# Patient Record
Sex: MALE | Race: BLACK OR AFRICAN AMERICAN | NOT HISPANIC OR LATINO | Employment: FULL TIME | URBAN - METROPOLITAN AREA
[De-identification: names, ages, dates, MRNs, and addresses within clinical notes are randomized per-mention and may not be internally consistent; named-entity substitution may affect disease eponyms.]

---

## 2021-11-18 ENCOUNTER — OFFICE VISIT (OUTPATIENT)
Dept: UROLOGY | Facility: CLINIC | Age: 21
End: 2021-11-18
Payer: COMMERCIAL

## 2021-11-18 VITALS
SYSTOLIC BLOOD PRESSURE: 119 MMHG | HEIGHT: 68 IN | WEIGHT: 209 LBS | DIASTOLIC BLOOD PRESSURE: 73 MMHG | HEART RATE: 100 BPM | BODY MASS INDEX: 31.67 KG/M2

## 2021-11-18 DIAGNOSIS — R30.0 DYSURIA: Primary | ICD-10-CM

## 2021-11-18 DIAGNOSIS — Z20.2 EXPOSURE TO STD: ICD-10-CM

## 2021-11-18 LAB
BILIRUB SERPL-MCNC: ABNORMAL MG/DL
BLOOD URINE, POC: NEGATIVE
CLARITY, POC UA: CLEAR
COLOR, POC UA: ABNORMAL
GLUCOSE UR QL STRIP: NORMAL
KETONES UR QL STRIP: ABNORMAL
LEUKOCYTE ESTERASE URINE, POC: ABNORMAL
NITRITE, POC UA: NEGATIVE
PH, POC UA: 5
PROTEIN, POC: ABNORMAL
SPECIFIC GRAVITY, POC UA: 1.02
UROBILINOGEN, POC UA: ABNORMAL

## 2021-11-18 PROCEDURE — 1160F PR REVIEW ALL MEDS BY PRESCRIBER/CLIN PHARMACIST DOCUMENTED: ICD-10-PCS | Mod: CPTII,S$GLB,, | Performed by: NURSE PRACTITIONER

## 2021-11-18 PROCEDURE — 99999 PR PBB SHADOW E&M-EST. PATIENT-LVL III: CPT | Mod: PBBFAC,,, | Performed by: NURSE PRACTITIONER

## 2021-11-18 PROCEDURE — 3008F BODY MASS INDEX DOCD: CPT | Mod: CPTII,S$GLB,, | Performed by: NURSE PRACTITIONER

## 2021-11-18 PROCEDURE — 1160F RVW MEDS BY RX/DR IN RCRD: CPT | Mod: CPTII,S$GLB,, | Performed by: NURSE PRACTITIONER

## 2021-11-18 PROCEDURE — 99999 PR PBB SHADOW E&M-EST. PATIENT-LVL III: ICD-10-PCS | Mod: PBBFAC,,, | Performed by: NURSE PRACTITIONER

## 2021-11-18 PROCEDURE — 87491 CHLMYD TRACH DNA AMP PROBE: CPT | Performed by: NURSE PRACTITIONER

## 2021-11-18 PROCEDURE — 3078F DIAST BP <80 MM HG: CPT | Mod: CPTII,S$GLB,, | Performed by: NURSE PRACTITIONER

## 2021-11-18 PROCEDURE — 96372 PR INJECTION,THERAP/PROPH/DIAG2ST, IM OR SUBCUT: ICD-10-PCS | Mod: S$GLB,,, | Performed by: NURSE PRACTITIONER

## 2021-11-18 PROCEDURE — 99203 OFFICE O/P NEW LOW 30 MIN: CPT | Mod: 25,S$GLB,, | Performed by: NURSE PRACTITIONER

## 2021-11-18 PROCEDURE — 3008F PR BODY MASS INDEX (BMI) DOCUMENTED: ICD-10-PCS | Mod: CPTII,S$GLB,, | Performed by: NURSE PRACTITIONER

## 2021-11-18 PROCEDURE — 81002 POCT URINE DIPSTICK WITHOUT MICROSCOPE: ICD-10-PCS | Mod: S$GLB,,, | Performed by: NURSE PRACTITIONER

## 2021-11-18 PROCEDURE — 3078F PR MOST RECENT DIASTOLIC BLOOD PRESSURE < 80 MM HG: ICD-10-PCS | Mod: CPTII,S$GLB,, | Performed by: NURSE PRACTITIONER

## 2021-11-18 PROCEDURE — 3074F SYST BP LT 130 MM HG: CPT | Mod: CPTII,S$GLB,, | Performed by: NURSE PRACTITIONER

## 2021-11-18 PROCEDURE — 3074F PR MOST RECENT SYSTOLIC BLOOD PRESSURE < 130 MM HG: ICD-10-PCS | Mod: CPTII,S$GLB,, | Performed by: NURSE PRACTITIONER

## 2021-11-18 PROCEDURE — 96372 THER/PROPH/DIAG INJ SC/IM: CPT | Mod: S$GLB,,, | Performed by: NURSE PRACTITIONER

## 2021-11-18 PROCEDURE — 1159F MED LIST DOCD IN RCRD: CPT | Mod: CPTII,S$GLB,, | Performed by: NURSE PRACTITIONER

## 2021-11-18 PROCEDURE — 1159F PR MEDICATION LIST DOCUMENTED IN MEDICAL RECORD: ICD-10-PCS | Mod: CPTII,S$GLB,, | Performed by: NURSE PRACTITIONER

## 2021-11-18 PROCEDURE — 81002 URINALYSIS NONAUTO W/O SCOPE: CPT | Mod: S$GLB,,, | Performed by: NURSE PRACTITIONER

## 2021-11-18 PROCEDURE — 87591 N.GONORRHOEAE DNA AMP PROB: CPT | Performed by: NURSE PRACTITIONER

## 2021-11-18 PROCEDURE — 87086 URINE CULTURE/COLONY COUNT: CPT | Performed by: NURSE PRACTITIONER

## 2021-11-18 PROCEDURE — 99203 PR OFFICE/OUTPT VISIT, NEW, LEVL III, 30-44 MIN: ICD-10-PCS | Mod: 25,S$GLB,, | Performed by: NURSE PRACTITIONER

## 2021-11-18 RX ORDER — AZITHROMYCIN 1 G/1
1 POWDER, FOR SUSPENSION ORAL ONCE
Qty: 1 PACKET | Refills: 0 | Status: SHIPPED | OUTPATIENT
Start: 2021-11-18 | End: 2021-11-18

## 2021-11-18 RX ORDER — CEFTRIAXONE 1 G/1
1 INJECTION, POWDER, FOR SOLUTION INTRAMUSCULAR; INTRAVENOUS
Status: DISCONTINUED | OUTPATIENT
Start: 2021-11-18 | End: 2021-11-18

## 2021-11-18 RX ORDER — CEFTRIAXONE 1 G/1
1 INJECTION, POWDER, FOR SOLUTION INTRAMUSCULAR; INTRAVENOUS
Status: COMPLETED | OUTPATIENT
Start: 2021-11-18 | End: 2021-11-18

## 2021-11-18 RX ADMIN — CEFTRIAXONE 1 G: 1 INJECTION, POWDER, FOR SOLUTION INTRAMUSCULAR; INTRAVENOUS at 08:11

## 2021-11-20 LAB — BACTERIA UR CULT: NO GROWTH

## 2021-11-22 ENCOUNTER — TELEPHONE (OUTPATIENT)
Dept: UROLOGY | Facility: CLINIC | Age: 21
End: 2021-11-22

## 2021-11-23 ENCOUNTER — TELEPHONE (OUTPATIENT)
Dept: UROLOGY | Facility: CLINIC | Age: 21
End: 2021-11-23

## 2021-11-23 LAB
C TRACH DNA SPEC QL NAA+PROBE: DETECTED
N GONORRHOEA DNA SPEC QL NAA+PROBE: DETECTED

## 2022-08-09 ENCOUNTER — TELEPHONE (OUTPATIENT)
Dept: INFECTIOUS DISEASES | Facility: CLINIC | Age: 22
End: 2022-08-09

## 2022-08-09 NOTE — TELEPHONE ENCOUNTER
8/9/22    Spoke w/pt advised call [facility] for PCP appointment, Urgent care or local health unit for evaluation of symptoms.  Patient expressed verbal understanding and stated will do so.      ----- Message from Tremontana Chevalier sent at 8/9/2022  4:07 PM CDT -----  Regarding: appt  Contact: pt @ [phone]  Pt calling to schedule a ppt with ID or urology, pt thinks he may have contracted an STD but not certain. Pt says symptom is funny feeling when urinating/no scent. Pt wanting to spk with someone to advice of which type of doctor he should see. Pt says he does not have a PCP. Pls call pt @ [phone].

## 2022-08-12 ENCOUNTER — LAB VISIT (OUTPATIENT)
Dept: LAB | Facility: HOSPITAL | Age: 22
End: 2022-08-12

## 2022-08-12 ENCOUNTER — OFFICE VISIT (OUTPATIENT)
Dept: INTERNAL MEDICINE | Facility: CLINIC | Age: 22
End: 2022-08-12
Payer: COMMERCIAL

## 2022-08-12 VITALS
SYSTOLIC BLOOD PRESSURE: 128 MMHG | DIASTOLIC BLOOD PRESSURE: 82 MMHG | OXYGEN SATURATION: 99 % | HEIGHT: 69 IN | WEIGHT: 220.88 LBS | BODY MASS INDEX: 32.72 KG/M2 | HEART RATE: 77 BPM

## 2022-08-12 DIAGNOSIS — Z00.00 ENCOUNTER FOR ANNUAL PHYSICAL EXAM: Primary | ICD-10-CM

## 2022-08-12 DIAGNOSIS — Z00.00 ENCOUNTER FOR ANNUAL PHYSICAL EXAM: ICD-10-CM

## 2022-08-12 DIAGNOSIS — Z86.19 HISTORY OF SYPHILIS: ICD-10-CM

## 2022-08-12 LAB
ALBUMIN SERPL BCP-MCNC: 4.2 G/DL (ref 3.5–5.2)
ALP SERPL-CCNC: 76 U/L (ref 55–135)
ALT SERPL W/O P-5'-P-CCNC: 12 U/L (ref 10–44)
ANION GAP SERPL CALC-SCNC: 7 MMOL/L (ref 8–16)
AST SERPL-CCNC: 18 U/L (ref 10–40)
BASOPHILS # BLD AUTO: 0.03 K/UL (ref 0–0.2)
BASOPHILS NFR BLD: 0.5 % (ref 0–1.9)
BILIRUB SERPL-MCNC: 0.8 MG/DL (ref 0.1–1)
BUN SERPL-MCNC: 18 MG/DL (ref 6–20)
CALCIUM SERPL-MCNC: 9.7 MG/DL (ref 8.7–10.5)
CHLORIDE SERPL-SCNC: 103 MMOL/L (ref 95–110)
CHOLEST SERPL-MCNC: 143 MG/DL (ref 120–199)
CHOLEST/HDLC SERPL: 2.7 {RATIO} (ref 2–5)
CO2 SERPL-SCNC: 27 MMOL/L (ref 23–29)
CREAT SERPL-MCNC: 1 MG/DL (ref 0.5–1.4)
DIFFERENTIAL METHOD: ABNORMAL
EOSINOPHIL # BLD AUTO: 0.1 K/UL (ref 0–0.5)
EOSINOPHIL NFR BLD: 0.9 % (ref 0–8)
ERYTHROCYTE [DISTWIDTH] IN BLOOD BY AUTOMATED COUNT: 12.7 % (ref 11.5–14.5)
EST. GFR  (NO RACE VARIABLE): >60 ML/MIN/1.73 M^2
ESTIMATED AVG GLUCOSE: 100 MG/DL (ref 68–131)
GLUCOSE SERPL-MCNC: 89 MG/DL (ref 70–110)
HBA1C MFR BLD: 5.1 % (ref 4–5.6)
HCT VFR BLD AUTO: 42.1 % (ref 40–54)
HDLC SERPL-MCNC: 53 MG/DL (ref 40–75)
HDLC SERPL: 37.1 % (ref 20–50)
HGB BLD-MCNC: 14.9 G/DL (ref 14–18)
IMM GRANULOCYTES # BLD AUTO: 0.01 K/UL (ref 0–0.04)
IMM GRANULOCYTES NFR BLD AUTO: 0.2 % (ref 0–0.5)
LDLC SERPL CALC-MCNC: 77.6 MG/DL (ref 63–159)
LYMPHOCYTES # BLD AUTO: 2.4 K/UL (ref 1–4.8)
LYMPHOCYTES NFR BLD: 44.1 % (ref 18–48)
MCH RBC QN AUTO: 31.1 PG (ref 27–31)
MCHC RBC AUTO-ENTMCNC: 35.4 G/DL (ref 32–36)
MCV RBC AUTO: 88 FL (ref 82–98)
MONOCYTES # BLD AUTO: 0.5 K/UL (ref 0.3–1)
MONOCYTES NFR BLD: 8.9 % (ref 4–15)
NEUTROPHILS # BLD AUTO: 2.5 K/UL (ref 1.8–7.7)
NEUTROPHILS NFR BLD: 45.4 % (ref 38–73)
NONHDLC SERPL-MCNC: 90 MG/DL
NRBC BLD-RTO: 0 /100 WBC
PLATELET # BLD AUTO: 283 K/UL (ref 150–450)
PMV BLD AUTO: 10.1 FL (ref 9.2–12.9)
POTASSIUM SERPL-SCNC: 4.2 MMOL/L (ref 3.5–5.1)
PROT SERPL-MCNC: 7.6 G/DL (ref 6–8.4)
RBC # BLD AUTO: 4.79 M/UL (ref 4.6–6.2)
SODIUM SERPL-SCNC: 137 MMOL/L (ref 136–145)
TRIGL SERPL-MCNC: 62 MG/DL (ref 30–150)
TSH SERPL DL<=0.005 MIU/L-ACNC: 0.58 UIU/ML (ref 0.4–4)
WBC # BLD AUTO: 5.51 K/UL (ref 3.9–12.7)

## 2022-08-12 PROCEDURE — 84443 ASSAY THYROID STIM HORMONE: CPT | Performed by: INTERNAL MEDICINE

## 2022-08-12 PROCEDURE — 3079F PR MOST RECENT DIASTOLIC BLOOD PRESSURE 80-89 MM HG: ICD-10-PCS | Mod: CPTII,S$GLB,, | Performed by: INTERNAL MEDICINE

## 2022-08-12 PROCEDURE — 3008F PR BODY MASS INDEX (BMI) DOCUMENTED: ICD-10-PCS | Mod: CPTII,S$GLB,, | Performed by: INTERNAL MEDICINE

## 2022-08-12 PROCEDURE — 3074F SYST BP LT 130 MM HG: CPT | Mod: CPTII,S$GLB,, | Performed by: INTERNAL MEDICINE

## 2022-08-12 PROCEDURE — 80053 COMPREHEN METABOLIC PANEL: CPT | Performed by: INTERNAL MEDICINE

## 2022-08-12 PROCEDURE — 1159F MED LIST DOCD IN RCRD: CPT | Mod: CPTII,S$GLB,, | Performed by: INTERNAL MEDICINE

## 2022-08-12 PROCEDURE — 3079F DIAST BP 80-89 MM HG: CPT | Mod: CPTII,S$GLB,, | Performed by: INTERNAL MEDICINE

## 2022-08-12 PROCEDURE — 3074F PR MOST RECENT SYSTOLIC BLOOD PRESSURE < 130 MM HG: ICD-10-PCS | Mod: CPTII,S$GLB,, | Performed by: INTERNAL MEDICINE

## 2022-08-12 PROCEDURE — 3008F BODY MASS INDEX DOCD: CPT | Mod: CPTII,S$GLB,, | Performed by: INTERNAL MEDICINE

## 2022-08-12 PROCEDURE — 99203 OFFICE O/P NEW LOW 30 MIN: CPT | Mod: S$GLB,,, | Performed by: INTERNAL MEDICINE

## 2022-08-12 PROCEDURE — 83036 HEMOGLOBIN GLYCOSYLATED A1C: CPT | Performed by: INTERNAL MEDICINE

## 2022-08-12 PROCEDURE — 99203 PR OFFICE/OUTPT VISIT, NEW, LEVL III, 30-44 MIN: ICD-10-PCS | Mod: S$GLB,,, | Performed by: INTERNAL MEDICINE

## 2022-08-12 PROCEDURE — 99999 PR PBB SHADOW E&M-EST. PATIENT-LVL III: ICD-10-PCS | Mod: PBBFAC,,, | Performed by: INTERNAL MEDICINE

## 2022-08-12 PROCEDURE — 85025 COMPLETE CBC W/AUTO DIFF WBC: CPT | Performed by: INTERNAL MEDICINE

## 2022-08-12 PROCEDURE — 86592 SYPHILIS TEST NON-TREP QUAL: CPT | Performed by: INTERNAL MEDICINE

## 2022-08-12 PROCEDURE — 1159F PR MEDICATION LIST DOCUMENTED IN MEDICAL RECORD: ICD-10-PCS | Mod: CPTII,S$GLB,, | Performed by: INTERNAL MEDICINE

## 2022-08-12 PROCEDURE — 80061 LIPID PANEL: CPT | Performed by: INTERNAL MEDICINE

## 2022-08-12 PROCEDURE — 99999 PR PBB SHADOW E&M-EST. PATIENT-LVL III: CPT | Mod: PBBFAC,,, | Performed by: INTERNAL MEDICINE

## 2022-08-12 PROCEDURE — 36415 COLL VENOUS BLD VENIPUNCTURE: CPT | Performed by: INTERNAL MEDICINE

## 2022-08-12 NOTE — PROGRESS NOTES
Subjective:       Patient ID: Tra Youssef is a 21 y.o. male.    Chief Complaint: Establish [facility]    HPI     Mr. Youssef is a 20 yo male who presents to [facility]. He is feeling well today with no new complaints.     He tested positive for syphilis in 11/2021 and was treated with IM penicillin in the emergency room. All other labs testing for STDs was negative including HIV, HSV, Hep C, were negative. He has no residual symptoms and denies any chest pain, SOB, headaches, vision changes or palpitations.     No medications.   No surgeries.   Works at [employer] in the [employer]. No smoking, alcohol or drugs.     Health Maintenance:  HIV: negative on 11/2021   Hep C: negative on 11/2021  Lipids: Order today   Vaccines: Tdap 2/2021, Flu due in 9/2022, HPV series completed in 2017, COVID x2     Review of Systems   Constitutional: Negative for activity change, chills, fatigue and fever.   HENT: Negative for sneezing, sore throat and tinnitus.    Respiratory: Negative for cough, chest tightness, shortness of breath and wheezing.    Cardiovascular: Negative for chest pain and leg swelling.   Gastrointestinal: Negative for abdominal pain, blood in stool and diarrhea.   Musculoskeletal: Negative for arthralgias and back pain.   Neurological: Negative for dizziness, seizures, light-headedness and headaches.             Objective:      Physical Exam  Constitutional:       Appearance: Normal appearance.   HENT:      Head: Normocephalic and atraumatic.   Cardiovascular:      Rate and Rhythm: Normal rate and regular rhythm.      Heart sounds: Normal heart sounds.   Pulmonary:      Effort: Pulmonary effort is normal.      Breath sounds: Normal breath sounds.   Abdominal:      General: Abdomen is flat.      Palpations: There is no mass.      Tenderness: There is no abdominal tenderness.   Skin:     General: Skin is warm and dry.   Neurological:      General: No focal deficit present.      Mental Status: He is alert and  oriented to person, place, and time.   Psychiatric:         Mood and Affect: Mood normal.         Behavior: Behavior normal.         Assessment:       Problem List Items Addressed This Visit    None     Visit Diagnoses     Encounter for annual physical exam    -  Primary    Relevant Orders    RPR    History of syphilis        Relevant Orders    Comprehensive Metabolic Panel    CBC Auto Differential    Lipid Panel    Hemoglobin A1C    TSH          Plan:         Tra was seen today for establish care.    Diagnoses and all orders for this visit:    Encounter for annual physical exam  -   Check lipids and basic labs today   HIV: negative on 11/2021   Hep C: negative on 11/2021  Lipids: Order today   Vaccines: Tdap 2/2021, Flu due in 9/2022, HPV series completed in 2017, COVID x2     History of syphilis    He tested positive for syphilis in 11/2021 and was treated with IM penicillin in the emergency room. All other labs testing for STDs was negative including HIV, HSV, Hep C, were negative. He has no residual symptoms and denies any chest pain, SOB, headaches, vision changes or palpitations.   -will repeat RPR today            Follow up one year for annual exam.     Yadi Gary MD   Internal Medicine   Primary Care

## 2022-08-13 LAB — RPR SER QL: NORMAL

## 2023-05-15 DIAGNOSIS — Z20.2 EXPOSURE TO STD: Primary | ICD-10-CM

## 2023-06-13 ENCOUNTER — PATIENT MESSAGE (OUTPATIENT)
Dept: RESEARCH | Facility: HOSPITAL | Age: 23
End: 2023-06-13
Payer: COMMERCIAL

## 2023-11-01 NOTE — PROGRESS NOTES
"Subjective:      Tra Youssef is a 23 y.o. male who returns today regarding his STDs.    Patient presents today unsure why he is here.  States that he got a letter in the mail stating it was time for appointment however the last time patient was seen in clinic was November 2021  He is only ever been seen for STD testing/treatment  States that he feels a nagging "buzzing sensation in his genitals" and would like to rechecked for STDs today.  Denies all other LUTS.  Denies gross hematuria.  Denies testicular pain.  Denies penile discharge.    The following portions of the patient's history were reviewed and updated as appropriate: allergies, current medications, past family history, past medical history, past social history, past surgical history and problem list.    Review of Systems  A comprehensive multipoint review of systems was negative except as otherwise stated in the HPI.     Objective:   Vitals: /73 (BP Location: Right arm, Patient Position: Sitting, BP Method: Large (Automatic))   Pulse 63   Resp 16   Ht 5' 9" (1.753 m)   Wt 97.6 kg (215 lb 2.7 oz)   BMI 31.77 kg/m²     Physical Exam   General: alert and oriented, no acute distress  Respiratory: Symmetric expansion, non-labored breathing  Cardiovascular: regular rate and rhythm, no peripheral edema  Abdomen: soft, non distended  Skin: normal coloration and turgor, no rashes, no suspicious skin lesions noted  Neuro: no gross deficits  Psych: normal judgment and insight, normal mood/affect, and non-anxious    Lab Review   Urinalysis demonstrates no ua   Lab Results   Component Value Date    WBC 5.51 08/12/2022    HGB 14.9 08/12/2022    HCT 42.1 08/12/2022    MCV 88 08/12/2022     08/12/2022     Lab Results   Component Value Date    CREATININE 0.82 02/04/2023    CREATININE 1.0 08/12/2022    BUN 16.0 02/04/2023    BUN 18 08/12/2022       Assessment and Plan:   1. Exposure to STD  - C. trachomatis/N. gonorrhoeae by AMP DNA [facility]; Urine  - " Trichomonas vaginalis, RNA, Qual, Urine       --will notify with results  --recommend patient follow-up with his primary care physician for future STD testing.    This note is dictated on M*Modal word recognition program.  There are word recognition mistakes that are occasionally missed on review.

## 2023-11-06 ENCOUNTER — OFFICE VISIT (OUTPATIENT)
Dept: UROLOGY | Facility: CLINIC | Age: 23
End: 2023-11-06
Payer: COMMERCIAL

## 2023-11-06 VITALS
WEIGHT: 215.19 LBS | BODY MASS INDEX: 31.87 KG/M2 | SYSTOLIC BLOOD PRESSURE: 119 MMHG | HEIGHT: 69 IN | RESPIRATION RATE: 16 BRPM | HEART RATE: 63 BPM | DIASTOLIC BLOOD PRESSURE: 73 MMHG

## 2023-11-06 DIAGNOSIS — Z20.2 EXPOSURE TO STD: Primary | ICD-10-CM

## 2023-11-06 PROCEDURE — 99213 OFFICE O/P EST LOW 20 MIN: CPT | Mod: S$GLB,,, | Performed by: NURSE PRACTITIONER

## 2023-11-06 PROCEDURE — 99999 PR PBB SHADOW E&M-EST. PATIENT-LVL III: CPT | Mod: PBBFAC,,, | Performed by: NURSE PRACTITIONER

## 2023-11-06 PROCEDURE — 87491 CHLMYD TRACH DNA AMP PROBE: CPT | Performed by: NURSE PRACTITIONER

## 2023-11-06 PROCEDURE — 87661 TRICHOMONAS VAGINALIS AMPLIF: CPT | Performed by: NURSE PRACTITIONER

## 2023-11-06 PROCEDURE — 99213 PR OFFICE/OUTPT VISIT, EST, LEVL III, 20-29 MIN: ICD-10-PCS | Mod: S$GLB,,, | Performed by: NURSE PRACTITIONER

## 2023-11-06 PROCEDURE — 87591 N.GONORRHOEAE DNA AMP PROB: CPT | Performed by: NURSE PRACTITIONER

## 2023-11-06 PROCEDURE — 99999 PR PBB SHADOW E&M-EST. PATIENT-LVL III: ICD-10-PCS | Mod: PBBFAC,,, | Performed by: NURSE PRACTITIONER

## 2023-11-07 LAB
C TRACH DNA SPEC QL NAA+PROBE: DETECTED
N GONORRHOEA DNA SPEC QL NAA+PROBE: DETECTED

## 2023-11-08 ENCOUNTER — TELEPHONE (OUTPATIENT)
Dept: UROLOGY | Facility: CLINIC | Age: 23
End: 2023-11-08
Payer: COMMERCIAL

## 2023-11-08 DIAGNOSIS — A74.9 CHLAMYDIA: ICD-10-CM

## 2023-11-08 DIAGNOSIS — A54.9 GONORRHEA: Primary | ICD-10-CM

## 2023-11-08 RX ORDER — AZITHROMYCIN 1 G/1
1 POWDER, FOR SUSPENSION ORAL ONCE
Qty: 1 PACKET | Refills: 0 | Status: SHIPPED | OUTPATIENT
Start: 2023-11-08 | End: 2023-11-08

## 2023-11-08 NOTE — TELEPHONE ENCOUNTER
----- Message from Sarah Beth Diamond NP sent at 11/8/2023 10:48 AM CST -----  Please call patient and let him know that his urine test came back positive for chlamydia and gonorrhea.  I have sent a prescription for azithromycin to his pharmacy.  It is a 1 time dose that will treat both infections.  He should also notify any recent sexual partners of this infection so they can be tested and treated.  I have also placed a referral for infectious disease he needs to follow-up with them for further testing and evaluation.    Thanks   M

## 2023-11-08 NOTE — TELEPHONE ENCOUNTER
Informed patient per Sarah Beth about urine results.  Informed patient that prescription was sent to pharmacy.  Informed patient a referral has been place for infectious disease.  Advised patient to give us a call if he has problems with scheduling infectious disease appt.    PASCUAL Loera

## 2023-11-09 LAB
SPECIMEN SOURCE: NORMAL
T VAGINALIS RRNA SPEC QL NAA+PROBE: NEGATIVE

## 2023-12-10 NOTE — PROGRESS NOTES
Subjective:      Patient ID: Tra Youssef is a 23 y.o. male.    Chief Complaint:Follow-up and Exposure to STD      History of Present Illness    Tra Youssef is referred by Urology for GC/chlamydia infection.  He was evaluated by Urology on 11/8.  Urine for GC/chlamydia was positive and prescription for azithromycin was sent and he was referred to ID for follow up.      Reports he took the azithromycin as directed and notified his partner.    Yes, he took the azithro.       Immunizations:  Has received HPV, Hep A, and Hep B    He is not currently having sex - not since October.  In recent months, prior to that, he only had one female partner.    Oral/genital/anal sex with female partners only.   He believes his prior partner may have had other sexual partners.   Denies IVDU.  Reports his partner did not use IVD  Typically uses condoms at the beginning of relationship.  Then no condom use.  Uses condoms with any new sexual partner     Past STI   History of GC/chlamydia in the past.   Received treatment with azithromycin and ceftriaxone  History of syphilis in 2021.  Treated with one dose bicillin.  Repeat RPR was negative in 2022    Denies fevers, chills.  Currently without genital sores, no penile discharge, no dysuria, no skin rashes, no lymphadenopathy   Complains of sore throat and congestion    Review of Systems   Constitutional: Positive for malaise/fatigue. Negative for chills, decreased appetite, fever, night sweats, weight gain and weight loss.   HENT:  Positive for congestion and sore throat. Negative for ear pain, hearing loss, hoarse voice and tinnitus.    Eyes:  Negative for blurred vision, redness and visual disturbance.   Cardiovascular:  Negative for chest pain, leg swelling and palpitations.   Respiratory:  Negative for cough, hemoptysis, shortness of breath, sputum production and wheezing.    Hematologic/Lymphatic: Negative for adenopathy. Does not bruise/bleed easily.   Skin:  Negative for  dry skin, itching, rash and suspicious lesions.   Musculoskeletal:  Negative for back pain, joint pain, myalgias and neck pain.   Gastrointestinal:  Negative for abdominal pain, constipation, diarrhea, heartburn, nausea and vomiting.   Genitourinary:  Negative for dysuria, flank pain, frequency, hematuria, hesitancy and urgency.   Neurological:  Negative for dizziness, headaches, numbness, paresthesias and weakness.   Psychiatric/Behavioral:  Negative for depression and memory loss. The patient does not have insomnia and is not nervous/anxious.    Allergic/Immunologic: Negative for environmental allergies, HIV exposure, hives and persistent infections.     Objective:   Physical Exam  Constitutional:       General: He is not in acute distress.     Appearance: Normal appearance. He is not ill-appearing, toxic-appearing or diaphoretic.   HENT:      Head: Normocephalic and atraumatic.      Nose: Nose normal.      Mouth/Throat:      Mouth: Mucous membranes are moist.      Pharynx: No oropharyngeal exudate or posterior oropharyngeal erythema.   Eyes:      General: No scleral icterus.     Conjunctiva/sclera: Conjunctivae normal.   Cardiovascular:      Rate and Rhythm: Normal rate and regular rhythm.   Pulmonary:      Effort: Pulmonary effort is normal. No respiratory distress.   Abdominal:      General: There is no distension.      Palpations: Abdomen is soft.   Genitourinary:     Comments: deferred  Musculoskeletal:      Cervical back: Normal range of motion.      Right lower leg: No edema.      Left lower leg: No edema.   Skin:     General: Skin is warm and dry.      Findings: No rash.   Neurological:      Mental Status: He is alert and oriented to person, place, and time.   Psychiatric:         Mood and Affect: Mood normal.         Behavior: Behavior normal.       Assessment:     1. Gonorrhea    2. Chlamydia infection    3. Routine screening for STI (sexually transmitted infection)    4. Exposure to STD    5. Chlamydia         Plan:       Ceftriaxone 500 mg injection X1 today    Will repeat the GC/chlamydia urine and oropharyngeal    HCV, HIV, Hepatitis B antibodies/antigen, RPR   Advised to  abstain from sex for at least 7 days after treatment    Follow up results.  If chlamydia still positive, treat with doxycycline 100 mg q 12 hours X 7 days    If oropharyngeal GC positive - will plan test of cure.  Otherwise repeat testing in 3 months    The patient understands and agrees to plan of care and all questions were answered.   Encouraged to call with questions or concerns.  Given my contact information.         45 minutes of total time was spent on this encounter, which includes face to face time and non-face to face time preparing to see the patient (eg, review of tests), Obtaining and/or reviewing separately obtained history, documenting clinical information in the electronic or other health record, independently interpreting results (not separately reported) and communicating results to the patient/family/caregiver, or care coordination (not separately reported).

## 2023-12-11 ENCOUNTER — OFFICE VISIT (OUTPATIENT)
Dept: INFECTIOUS DISEASES | Facility: CLINIC | Age: 23
End: 2023-12-11
Payer: COMMERCIAL

## 2023-12-11 ENCOUNTER — LAB VISIT (OUTPATIENT)
Dept: LAB | Facility: HOSPITAL | Age: 23
End: 2023-12-11
Payer: COMMERCIAL

## 2023-12-11 ENCOUNTER — CLINICAL SUPPORT (OUTPATIENT)
Dept: INFECTIOUS DISEASES | Facility: CLINIC | Age: 23
End: 2023-12-11
Payer: COMMERCIAL

## 2023-12-11 VITALS
DIASTOLIC BLOOD PRESSURE: 79 MMHG | SYSTOLIC BLOOD PRESSURE: 128 MMHG | WEIGHT: 208.56 LBS | BODY MASS INDEX: 30.89 KG/M2 | HEIGHT: 69 IN | TEMPERATURE: 98 F | HEART RATE: 52 BPM

## 2023-12-11 DIAGNOSIS — Z20.2 EXPOSURE TO STD: ICD-10-CM

## 2023-12-11 DIAGNOSIS — Z11.3 ROUTINE SCREENING FOR STI (SEXUALLY TRANSMITTED INFECTION): ICD-10-CM

## 2023-12-11 DIAGNOSIS — A74.9 CHLAMYDIA: ICD-10-CM

## 2023-12-11 DIAGNOSIS — A54.9 GONORRHEA: ICD-10-CM

## 2023-12-11 DIAGNOSIS — A74.9 CHLAMYDIA INFECTION: ICD-10-CM

## 2023-12-11 DIAGNOSIS — A54.9 GONORRHEA: Primary | ICD-10-CM

## 2023-12-11 LAB
HBV CORE IGM SERPL QL IA: NORMAL
HBV SURFACE AB SER-ACNC: 26.89 MIU/ML
HBV SURFACE AB SER-ACNC: REACTIVE M[IU]/ML
HBV SURFACE AG SERPL QL IA: NORMAL
HCV AB SERPL QL IA: NORMAL
HIV 1+2 AB+HIV1 P24 AG SERPL QL IA: NORMAL

## 2023-12-11 PROCEDURE — 99999 PR PBB SHADOW E&M-EST. PATIENT-LVL I: ICD-10-PCS | Mod: PBBFAC,,,

## 2023-12-11 PROCEDURE — 99215 OFFICE O/P EST HI 40 MIN: CPT | Mod: S$GLB,,, | Performed by: NURSE PRACTITIONER

## 2023-12-11 PROCEDURE — 86803 HEPATITIS C AB TEST: CPT | Performed by: NURSE PRACTITIONER

## 2023-12-11 PROCEDURE — 96372 THER/PROPH/DIAG INJ SC/IM: CPT | Mod: S$GLB,,, | Performed by: INTERNAL MEDICINE

## 2023-12-11 PROCEDURE — 87491 CHLMYD TRACH DNA AMP PROBE: CPT | Performed by: NURSE PRACTITIONER

## 2023-12-11 PROCEDURE — 99999 PR PBB SHADOW E&M-EST. PATIENT-LVL I: CPT | Mod: PBBFAC,,,

## 2023-12-11 PROCEDURE — 99999 PR PBB SHADOW E&M-EST. PATIENT-LVL IV: CPT | Mod: PBBFAC,,, | Performed by: NURSE PRACTITIONER

## 2023-12-11 PROCEDURE — 99999 PR PBB SHADOW E&M-EST. PATIENT-LVL IV: ICD-10-PCS | Mod: PBBFAC,,, | Performed by: NURSE PRACTITIONER

## 2023-12-11 PROCEDURE — 86705 HEP B CORE ANTIBODY IGM: CPT | Performed by: NURSE PRACTITIONER

## 2023-12-11 PROCEDURE — 87389 HIV-1 AG W/HIV-1&-2 AB AG IA: CPT | Performed by: NURSE PRACTITIONER

## 2023-12-11 PROCEDURE — 99215 PR OFFICE/OUTPT VISIT, EST, LEVL V, 40-54 MIN: ICD-10-PCS | Mod: S$GLB,,, | Performed by: NURSE PRACTITIONER

## 2023-12-11 PROCEDURE — 86706 HEP B SURFACE ANTIBODY: CPT | Performed by: NURSE PRACTITIONER

## 2023-12-11 PROCEDURE — 87491 CHLMYD TRACH DNA AMP PROBE: CPT | Mod: 59 | Performed by: NURSE PRACTITIONER

## 2023-12-11 PROCEDURE — 96372 PR INJECTION,THERAP/PROPH/DIAG2ST, IM OR SUBCUT: ICD-10-PCS | Mod: S$GLB,,, | Performed by: INTERNAL MEDICINE

## 2023-12-11 PROCEDURE — 87591 N.GONORRHOEAE DNA AMP PROB: CPT | Performed by: NURSE PRACTITIONER

## 2023-12-11 PROCEDURE — 87340 HEPATITIS B SURFACE AG IA: CPT | Performed by: NURSE PRACTITIONER

## 2023-12-11 PROCEDURE — 86592 SYPHILIS TEST NON-TREP QUAL: CPT | Performed by: NURSE PRACTITIONER

## 2023-12-11 PROCEDURE — 87591 N.GONORRHOEAE DNA AMP PROB: CPT | Mod: 59 | Performed by: NURSE PRACTITIONER

## 2023-12-11 PROCEDURE — 36415 COLL VENOUS BLD VENIPUNCTURE: CPT | Performed by: NURSE PRACTITIONER

## 2023-12-11 RX ORDER — AZITHROMYCIN 1 G/1
POWDER, FOR SUSPENSION ORAL
COMMUNITY
Start: 2023-11-08

## 2023-12-11 NOTE — PATIENT INSTRUCTIONS
Ceftriaxone 500 mg injection X1 today    Will repeat the GC/chlamydia urine and oropharyngeal test today    Will check Hepatitis C, HIV, Hepatitis B, and RPR (syphilis ) today    If your chlamydia returns positive, will start another oral antibiotic called doxyclycline X 7 days.     Abstain from sex for at least 7 days after treatment    I'll call you with test results

## 2023-12-11 NOTE — PROGRESS NOTES
Patient received ceftriaxone 500 mg mixed with 2 ml xylocaine IM to the right buttocks.  Tolerated well and left in NAD

## 2023-12-12 LAB
C TRACH DNA SPEC QL NAA+PROBE: NOT DETECTED
N GONORRHOEA DNA SPEC QL NAA+PROBE: NOT DETECTED
RPR SER QL: NORMAL

## 2023-12-13 ENCOUNTER — TELEPHONE (OUTPATIENT)
Dept: INFECTIOUS DISEASES | Facility: CLINIC | Age: 23
End: 2023-12-13
Payer: COMMERCIAL

## 2023-12-13 LAB
C TRACH RRNA SPEC QL NAA+PROBE: NEGATIVE
N GONORRHOEA RRNA SPEC QL NAA+PROBE: POSITIVE
N.GONORROHEAE, AMP RNA SOURCE: ABNORMAL
SPECIMEN SOURCE: ABNORMAL

## 2023-12-14 ENCOUNTER — TELEPHONE (OUTPATIENT)
Dept: INFECTIOUS DISEASES | Facility: CLINIC | Age: 23
End: 2023-12-14
Payer: COMMERCIAL

## 2023-12-14 NOTE — TELEPHONE ENCOUNTER
Called patient to advised of positive throat swab result  He will come back in 30 days for test of cure.   He will make the appointment and reach out to me if any difficulties

## 2024-01-08 ENCOUNTER — TELEPHONE (OUTPATIENT)
Dept: INFECTIOUS DISEASES | Facility: CLINIC | Age: 24
End: 2024-01-08

## 2024-01-08 NOTE — TELEPHONE ENCOUNTER
----- Message from SCAR Saenz, ANP sent at 1/7/2024 12:40 PM CST -----  Will you call patient and set up a follow up with me on 1/17 to re-swab his throat?   Thanks.

## 2024-01-18 ENCOUNTER — TELEPHONE (OUTPATIENT)
Dept: INFECTIOUS DISEASES | Facility: CLINIC | Age: 24
End: 2024-01-18

## 2024-01-18 NOTE — TELEPHONE ENCOUNTER
----- Message from SCAR Saenz, ANP sent at 1/18/2024  1:21 PM CST -----  This fellow needs a re-swab and canceled yesterday's appt.   Will you call him and re-schedule?   Thanks.

## 2024-01-29 ENCOUNTER — LAB VISIT (OUTPATIENT)
Dept: LAB | Facility: HOSPITAL | Age: 24
End: 2024-01-29
Payer: COMMERCIAL

## 2024-01-29 ENCOUNTER — OFFICE VISIT (OUTPATIENT)
Dept: INFECTIOUS DISEASES | Facility: CLINIC | Age: 24
End: 2024-01-29
Payer: COMMERCIAL

## 2024-01-29 VITALS
TEMPERATURE: 98 F | HEART RATE: 75 BPM | SYSTOLIC BLOOD PRESSURE: 117 MMHG | BODY MASS INDEX: 32.2 KG/M2 | WEIGHT: 217.38 LBS | HEIGHT: 69 IN | DIASTOLIC BLOOD PRESSURE: 70 MMHG

## 2024-01-29 DIAGNOSIS — A54.9 GONORRHEA: ICD-10-CM

## 2024-01-29 DIAGNOSIS — Z20.2 EXPOSURE TO STD: Primary | ICD-10-CM

## 2024-01-29 DIAGNOSIS — Z20.2 EXPOSURE TO STD: ICD-10-CM

## 2024-01-29 LAB — HIV 1+2 AB+HIV1 P24 AG SERPL QL IA: NORMAL

## 2024-01-29 PROCEDURE — 87591 N.GONORRHOEAE DNA AMP PROB: CPT | Performed by: STUDENT IN AN ORGANIZED HEALTH CARE EDUCATION/TRAINING PROGRAM

## 2024-01-29 PROCEDURE — 36415 COLL VENOUS BLD VENIPUNCTURE: CPT | Performed by: STUDENT IN AN ORGANIZED HEALTH CARE EDUCATION/TRAINING PROGRAM

## 2024-01-29 PROCEDURE — 99999 PR PBB SHADOW E&M-EST. PATIENT-LVL III: CPT | Mod: PBBFAC,,, | Performed by: STUDENT IN AN ORGANIZED HEALTH CARE EDUCATION/TRAINING PROGRAM

## 2024-01-29 PROCEDURE — 87389 HIV-1 AG W/HIV-1&-2 AB AG IA: CPT | Performed by: STUDENT IN AN ORGANIZED HEALTH CARE EDUCATION/TRAINING PROGRAM

## 2024-01-29 PROCEDURE — 99213 OFFICE O/P EST LOW 20 MIN: CPT | Mod: S$GLB,,, | Performed by: STUDENT IN AN ORGANIZED HEALTH CARE EDUCATION/TRAINING PROGRAM

## 2024-01-29 NOTE — PROGRESS NOTES
Infectious Disease Clinic Visit    Reason:    ID clinic f/u for STI     HPI:        23M, heterosexual, who previously completed STI testing with urology 11/06; Urine G&C positive. Trich negative. Tx'd with azithromycin and later seen as referral in ID 12/11/23.     Last sexual encounter: 10/2023, female partner; non-mutually monogamous. He believes his prior partner may have had other sexual partners.  In recent months, prior to that, he only had one female partner.    Oral/genital/anal sex; historically female partners only.   Typically uses condoms at the beginning of relationship; then no condom use. Uses condoms with any new sexual partner.     Past STI   History of GC/chlamydia in the past (11/2021), tx'd with azithromycin and ceftriaxone.  History of syphilis in 2021.  Treated with one dose bicillin. Repeat RPR was negative in 2022    At prior ID clinic visit 12/11; repeat urine studies again positive for both G/C. Also, throat cx +Gonorrhea; with associated sore throat / congestion at that time; and testicular discomfort. But no skin lesions, urethral d/c, oral lesions, LAD, body aches, joint pain, or f/c/s. Pt received IM-ceftriaxone and azithromycin in ID clinic; completed with full resolution of sxs. Of note, other STI labs done 12/11: HIV negative, RPR non-reative, HCV Ab negative, HepB studies demonstrate immunity from prior immunization.     TODAY, 01/29, Pt presents for scheduled f/u appt for BREEZY for oropharyngeal Gonococcal infection, s/p tx't 12/11/23 with IM CTX.  Pt remains asymptomatic after tx't. Pt reports that last sexual encounter remains the same, 10/2023, and states female partner exposed to at that time, has been evaluated and treated as well.         Review of Systems   Constitutional:  Negative for chills, diaphoresis, fever and weight loss.   HENT:  Negative for congestion, sinus pain and sore throat.    Eyes:  Negative for pain and discharge.   Respiratory:  Negative for cough, sputum  production and shortness of breath.    Cardiovascular:  Negative for chest pain and leg swelling.   Gastrointestinal:  Negative for abdominal pain, diarrhea, nausea and vomiting.   Genitourinary:  Negative for dysuria and hematuria.   Musculoskeletal:  Negative for joint pain.   Skin:  Negative for rash.   Neurological:  Negative for focal weakness and headaches.   Endo/Heme/Allergies:  Negative for environmental allergies.   Psychiatric/Behavioral:  Negative for substance abuse. The patient is not nervous/anxious.          Physical Exam  Vitals reviewed.   Constitutional:       General: He is not in acute distress.     Appearance: Normal appearance. He is not ill-appearing, toxic-appearing or diaphoretic.   HENT:      Head: Normocephalic and atraumatic.      Nose:      Comments: No Lymphadenopathy.     Mouth/Throat:      Mouth: Mucous membranes are moist.      Pharynx: Oropharynx is clear. Posterior oropharyngeal erythema present.      Comments: Mild erythema of oropharynx  Eyes:      General: No scleral icterus.     Conjunctiva/sclera: Conjunctivae normal.   Cardiovascular:      Rate and Rhythm: Normal rate.      Heart sounds: Normal heart sounds.   Pulmonary:      Effort: Pulmonary effort is normal.      Breath sounds: Normal breath sounds.   Abdominal:      General: Abdomen is flat. Bowel sounds are normal.      Palpations: Abdomen is soft.      Tenderness: There is no abdominal tenderness.   Musculoskeletal:         General: No swelling or tenderness. Normal range of motion.      Cervical back: Normal range of motion. No tenderness.      Right lower leg: No edema.      Left lower leg: No edema.   Lymphadenopathy:      Cervical: No cervical adenopathy.   Skin:     General: Skin is warm and dry.      Coloration: Skin is not jaundiced.      Findings: No erythema or rash.   Neurological:      Mental Status: He is alert and oriented to person, place, and time. Mental status is at baseline.   Psychiatric:          "Behavior: Behavior normal.         Thought Content: Thought content normal.           Review of patient's allergies indicates:  No Known Allergies      Current Outpatient Medications:     azithromycin (ZITHROMAX) 1 gram Pack, SMARTSI Gram(s) By Mouth Once, Disp: , Rfl:     History reviewed. No pertinent past medical history.    Lab Results   Component Value Date    WBC 5.51 2022    CREATININE 0.82 2023    AST 19 2023    ALT 10 2023    ALKPHOS 76 2022       Immunization History   Administered Date(s) Administered    Tdap 2021         Reviewed available labs and imaging.     Vitals:    24 1108   BP: 117/70   BP Location: Left arm   Pulse: 75   Temp: 98.2 °F (36.8 °C)   TempSrc: Oral   Weight: 98.6 kg (217 lb 6 oz)   Height: 5' 9" (1.753 m)         Assessment:  Encounter Diagnoses   Name Primary?    Exposure to STD Yes    Gonorrhea    23M, heterosexual male, with prior G/C and syphilis infection (tx'd then) -- now with more recent G/C urine positive  and throat +G () s/p IM-CTX and azithromycin; with resolution of sore throat. Denies sexual encounter since tx't, last encounter remains 10/2023.    Plan:     Completed G/C throat swab for BREEZY, and repeat HIV 1/2 Ab to confirm remains negative >3mo s/p last sexual encounter (10/2023).  If repeat G/C throat swab positive, will repeat tx with IM CTX; otherwise if negative, then no further ID work-up or abx indicated. Pt aware and understood. Will f/u G/C throat swab final results.      Follow-up: PRN      Orders Placed This Encounter   Procedures    C.TRACH/N.GONOR AMP RNA (Ocular/Fluid)    HIV 1/2 Ag/Ab (4th Gen)       "

## 2024-01-30 LAB
C TRACH RRNA SPEC QL NAA+PROBE: NORMAL
C.TRACH RNA SOURCE: NORMAL
N.GONORROHEAE, AMP RNA SOURCE: NORMAL
N.GONORROHEAE, MISC. AMP RNA: NORMAL

## 2024-02-23 PROBLEM — A54.5 PHARYNGITIS, GONOCOCCAL: Status: ACTIVE | Noted: 2024-02-23

## 2024-02-23 NOTE — PROGRESS NOTES
Infectious Disease Clinic Visit    Reason:    ID clinic f/u for STI     HPI:        23M, heterosexual, who previously completed STI testing with urology 11/06; Urine G&C positive. Trich negative. Tx'd with azithromycin and later seen as referral in ID 12/11/23.     Last sexual encounter: 10/2023, female partner; non-mutually monogamous. He believes his prior partner may have had other sexual partners.  In recent months, prior to that, he only had one female partner.    Oral/genital/anal sex; historically female partners only.   Typically uses condoms at the beginning of relationship; then no condom use. Uses condoms with any new sexual partner.     Past STI   History of GC/chlamydia in the past (11/2021), tx'd with azithromycin and ceftriaxone.  History of syphilis in 2021.  Treated with one dose bicillin. Repeat RPR was negative in 2022    At prior ID clinic visit 12/11; repeat urine studies again positive for both G/C. Also, throat cx +Gonorrhea; with associated sore throat / congestion at that time; and testicular discomfort. But no skin lesions, urethral d/c, oral lesions, LAD, body aches, joint pain, or f/c/s. Pt received IM-ceftriaxone and azithromycin in ID clinic; completed with full resolution of sxs. Of note, other STI labs done 12/11: HIV negative, RPR non-reative, HCV Ab negative, HepB studies demonstrate immunity from prior immunization.     Prior ID visit (01/29): Pt presents for scheduled f/u appt for BREEZY for oropharyngeal Gonococcal infection, s/p tx't 12/11/23 with IM CTX.  Pt remains asymptomatic after tx't. Pt reports that last sexual encounter remains the same, 10/2023, and states female partner exposed to at that time, has been evaluated and treated as well.     -- TODAY (02/26), pt presents for scheduled f/u appt to repeat throat swab for culture and G/C NAAT. Pt remains asymptomatic, and denies any sexual encounters since exposure 10/2023. Pt failed tx't with first line therapy: IM CTX 500mg  w/ oral azithromycin 1g. Today, after throat swabs completed. Pt received Gentamicin IM 250mg with oral azithromycin 2g. Pt to return to clinic in 3-4wks for repeat throat culture and swab for G/C NAAT as BREEZY.       Review of Systems   Constitutional:  Negative for chills, diaphoresis, fever and weight loss.   HENT:  Negative for congestion, sinus pain and sore throat.    Eyes:  Negative for pain and discharge.   Respiratory:  Negative for cough, sputum production and shortness of breath.    Cardiovascular:  Negative for chest pain and leg swelling.   Gastrointestinal:  Negative for abdominal pain, diarrhea, nausea and vomiting.   Genitourinary:  Negative for dysuria and hematuria.   Musculoskeletal:  Negative for joint pain.   Skin:  Negative for rash.   Neurological:  Negative for focal weakness and headaches.   Endo/Heme/Allergies:  Negative for environmental allergies.   Psychiatric/Behavioral:  Negative for substance abuse. The patient is not nervous/anxious.          Physical Exam  Vitals reviewed.   Constitutional:       General: He is not in acute distress.     Appearance: Normal appearance. He is not ill-appearing, toxic-appearing or diaphoretic.   HENT:      Head: Normocephalic and atraumatic.      Nose:      Comments: No Lymphadenopathy.     Mouth/Throat:      Mouth: Mucous membranes are moist.      Pharynx: Oropharynx is clear. Posterior oropharyngeal erythema present.      Comments: Mild erythema of oropharynx  Eyes:      General: No scleral icterus.     Conjunctiva/sclera: Conjunctivae normal.   Cardiovascular:      Rate and Rhythm: Normal rate.      Heart sounds: Normal heart sounds.   Pulmonary:      Effort: Pulmonary effort is normal.      Breath sounds: Normal breath sounds.   Abdominal:      General: Abdomen is flat. Bowel sounds are normal.      Palpations: Abdomen is soft.      Tenderness: There is no abdominal tenderness.   Musculoskeletal:         General: No swelling or tenderness. Normal  range of motion.      Cervical back: Normal range of motion. No tenderness.      Right lower leg: No edema.      Left lower leg: No edema.   Lymphadenopathy:      Cervical: No cervical adenopathy.   Skin:     General: Skin is warm and dry.      Coloration: Skin is not jaundiced.      Findings: No erythema or rash.   Neurological:      Mental Status: He is alert and oriented to person, place, and time. Mental status is at baseline.   Psychiatric:         Behavior: Behavior normal.         Thought Content: Thought content normal.           Review of patient's allergies indicates:  No Known Allergies      Current Outpatient Medications:     azithromycin (ZITHROMAX) 1 gram Pack, SMARTSI Gram(s) By Mouth Once, Disp: , Rfl:     azithromycin (ZITHROMAX) 500 MG tablet, Take 4 tablets (2,000 mg total) by mouth once daily. Take 4 x 500mg tabs, once by mouth. for 1 day, Disp: 4 tablet, Rfl: 0    Current Facility-Administered Medications:     azithromycin tablet 2,000 mg, 2,000 mg, Oral, 1 time in Clinic/HOD, Amanuel Montana PA-C    No past medical history on file.    Lab Results   Component Value Date    WBC 5.51 2022    CREATININE 0.82 2023    AST 19 2023    ALT 10 2023    ALKPHOS 76 2022       Immunization History   Administered Date(s) Administered    DTaP 2000, 2001, 2002, 2004    HIB 2000, 2002    HPV 9-Valent 10/29/2015, 2016, 10/12/2017    Hep B / HiB 2001, 2004    Hepatitis A, Pediatric/Adolescent, 2 Dose 10/29/2015, 2016    Hepatitis B 2000    Hepatitis B, Pediatric/Adolescent 2000, 2001    IPV 2000, 2001, 2002, 2004, 2010    Influenza - Quadrivalent - PF *Preferred* (6 months and older) 10/07/2016    MMR 2001, 2004    Td (ADULT) 2010    Tdap 2012, 10/12/2017, 2021    Varicella 10/09/2001, 2010, 2012         Reviewed available labs and  "imaging.     Vitals:    02/26/24 1141   BP: 137/75   BP Location: Left arm   Pulse: 69   Temp: (!) 100.6 °F (38.1 °C)   TempSrc: Oral   Weight: 100.4 kg (221 lb 5.5 oz)   Height: 5' 9" (1.753 m)           Assessment:  Encounter Diagnoses   Name Primary?    Gonorrhea Yes    Pharyngitis, gonococcal      TODAY (02/26), pt presents for scheduled f/u appt to repeat throat swab for culture and G/C NAAT. Pt remains asymptomatic, and denies any sexual encounters since exposure 10/2023. Pt failed tx't with first line therapy: IM CTX 500mg w/ oral azithromycin 1g. Today, after throat swabs completed. Pt received Gentamicin IM 250mg with oral azithromycin 2g. Pt to return to clinic in 3-4wks for repeat throat culture and swab for G/C NAAT as BREEZY.     Plan:   Given IM-Gentamicin 240mg in infusion suite; with oral azithromycin 2g eRX sent to pharm.   Completed throat swab x2, for culture and G/C NAAT.     Follow-up: 3wks to repeat BREEZY with throat swab x 2, Cx and G/C NAAT.      Orders Placed This Encounter   Procedures    CULTURE, GONOCOCCUS    C.trach/N.gonor AMP RNA       "

## 2024-02-26 ENCOUNTER — INFUSION (OUTPATIENT)
Dept: INFECTIOUS DISEASES | Facility: HOSPITAL | Age: 24
End: 2024-02-26
Payer: COMMERCIAL

## 2024-02-26 ENCOUNTER — TELEPHONE (OUTPATIENT)
Dept: INFECTIOUS DISEASES | Facility: CLINIC | Age: 24
End: 2024-02-26

## 2024-02-26 ENCOUNTER — OFFICE VISIT (OUTPATIENT)
Dept: INFECTIOUS DISEASES | Facility: CLINIC | Age: 24
End: 2024-02-26
Payer: COMMERCIAL

## 2024-02-26 VITALS
TEMPERATURE: 101 F | BODY MASS INDEX: 32.78 KG/M2 | HEIGHT: 69 IN | DIASTOLIC BLOOD PRESSURE: 75 MMHG | SYSTOLIC BLOOD PRESSURE: 137 MMHG | HEART RATE: 69 BPM | WEIGHT: 221.31 LBS

## 2024-02-26 VITALS
DIASTOLIC BLOOD PRESSURE: 73 MMHG | WEIGHT: 212.06 LBS | OXYGEN SATURATION: 100 % | TEMPERATURE: 99 F | BODY MASS INDEX: 31.41 KG/M2 | SYSTOLIC BLOOD PRESSURE: 135 MMHG | HEIGHT: 69 IN | HEART RATE: 84 BPM | RESPIRATION RATE: 20 BRPM

## 2024-02-26 DIAGNOSIS — A54.5 PHARYNGITIS, GONOCOCCAL: Primary | ICD-10-CM

## 2024-02-26 DIAGNOSIS — A54.9 GONORRHEA: Primary | ICD-10-CM

## 2024-02-26 DIAGNOSIS — A54.5 PHARYNGITIS, GONOCOCCAL: ICD-10-CM

## 2024-02-26 PROCEDURE — 99999 PR PBB SHADOW E&M-EST. PATIENT-LVL III: CPT | Mod: PBBFAC,,, | Performed by: STUDENT IN AN ORGANIZED HEALTH CARE EDUCATION/TRAINING PROGRAM

## 2024-02-26 PROCEDURE — 63600175 PHARM REV CODE 636 W HCPCS: Performed by: STUDENT IN AN ORGANIZED HEALTH CARE EDUCATION/TRAINING PROGRAM

## 2024-02-26 PROCEDURE — 99214 OFFICE O/P EST MOD 30 MIN: CPT | Mod: S$GLB,,, | Performed by: STUDENT IN AN ORGANIZED HEALTH CARE EDUCATION/TRAINING PROGRAM

## 2024-02-26 PROCEDURE — 87591 N.GONORRHOEAE DNA AMP PROB: CPT | Performed by: STUDENT IN AN ORGANIZED HEALTH CARE EDUCATION/TRAINING PROGRAM

## 2024-02-26 PROCEDURE — 96372 THER/PROPH/DIAG INJ SC/IM: CPT

## 2024-02-26 PROCEDURE — 87081 CULTURE SCREEN ONLY: CPT | Performed by: STUDENT IN AN ORGANIZED HEALTH CARE EDUCATION/TRAINING PROGRAM

## 2024-02-26 RX ORDER — AZITHROMYCIN 500 MG/1
2000 TABLET, FILM COATED ORAL ONCE
Start: 2024-02-26 | End: 2024-02-26

## 2024-02-26 RX ORDER — AZITHROMYCIN 250 MG/1
2000 TABLET, FILM COATED ORAL ONCE
Start: 2024-02-26 | End: 2024-02-26

## 2024-02-26 RX ORDER — GENTAMICIN 40 MG/ML
2.5 INJECTION, SOLUTION INTRAMUSCULAR; INTRAVENOUS
Status: COMPLETED | OUTPATIENT
Start: 2024-02-26 | End: 2024-02-26

## 2024-02-26 RX ORDER — AZITHROMYCIN 500 MG/1
2000 TABLET, FILM COATED ORAL
Status: SHIPPED | OUTPATIENT
Start: 2024-02-26

## 2024-02-26 RX ORDER — AZITHROMYCIN 500 MG/1
2000 TABLET, FILM COATED ORAL DAILY
Qty: 4 TABLET | Refills: 0 | Status: SHIPPED | OUTPATIENT
Start: 2024-02-26 | End: 2024-02-27

## 2024-02-26 RX ORDER — GENTAMICIN 40 MG/ML
2.5 INJECTION, SOLUTION INTRAMUSCULAR; INTRAVENOUS ONCE
Qty: 6.02 ML | Refills: 0 | Status: SHIPPED | OUTPATIENT
Start: 2024-02-26 | End: 2024-02-26

## 2024-02-26 RX ADMIN — GENTAMICIN SULFATE 251 MG: 40 INJECTION, SOLUTION INTRAMUSCULAR; INTRAVENOUS at 03:02

## 2024-02-26 NOTE — PROGRESS NOTES
Pt arrived to infusion clinic for gentamicin injection. Injection given IM to upper leg at pt request. Pt tolerated well. Instructed pt to wait 15 minutes to be checked. Pt verbalized understanding.

## 2024-02-26 NOTE — TELEPHONE ENCOUNTER
Called pharmacy and answered their question about injection.     ----- Message from Maddi Gee sent at 2/26/2024  3:44 PM CST -----  Regarding: Pt Advice  Contact: [phone]  Kyle nathan [facility]  calling to speak with someone in provider office regarding gentamicin (GARAMYCIN) 40 mg/mL injection. Please call back at  [phone]

## 2024-02-29 ENCOUNTER — TELEPHONE (OUTPATIENT)
Dept: INFECTIOUS DISEASES | Facility: CLINIC | Age: 24
End: 2024-02-29

## 2024-02-29 LAB — BACTERIA GENITAL AEROBE CULT: NORMAL
